# Patient Record
Sex: FEMALE | Race: ASIAN | NOT HISPANIC OR LATINO
[De-identification: names, ages, dates, MRNs, and addresses within clinical notes are randomized per-mention and may not be internally consistent; named-entity substitution may affect disease eponyms.]

---

## 2019-06-18 ENCOUNTER — ASOB RESULT (OUTPATIENT)
Age: 34
End: 2019-06-18

## 2019-06-18 ENCOUNTER — APPOINTMENT (OUTPATIENT)
Dept: ANTEPARTUM | Facility: CLINIC | Age: 34
End: 2019-06-18
Payer: COMMERCIAL

## 2019-06-18 PROCEDURE — 76811 OB US DETAILED SNGL FETUS: CPT

## 2019-06-18 PROCEDURE — 76817 TRANSVAGINAL US OBSTETRIC: CPT | Mod: 59

## 2019-10-27 ENCOUNTER — INPATIENT (INPATIENT)
Facility: HOSPITAL | Age: 34
LOS: 1 days | Discharge: HOME | End: 2019-10-29
Attending: OBSTETRICS & GYNECOLOGY | Admitting: OBSTETRICS & GYNECOLOGY
Payer: COMMERCIAL

## 2019-10-27 VITALS — HEIGHT: 62 IN | WEIGHT: 147.71 LBS

## 2019-10-27 DIAGNOSIS — Z3A.00 WEEKS OF GESTATION OF PREGNANCY NOT SPECIFIED: ICD-10-CM

## 2019-10-27 DIAGNOSIS — O26.899 OTHER SPECIFIED PREGNANCY RELATED CONDITIONS, UNSPECIFIED TRIMESTER: ICD-10-CM

## 2019-10-27 DIAGNOSIS — Z34.80 ENCOUNTER FOR SUPERVISION OF OTHER NORMAL PREGNANCY, UNSPECIFIED TRIMESTER: ICD-10-CM

## 2019-10-27 LAB
BASOPHILS # BLD AUTO: 0.02 K/UL — SIGNIFICANT CHANGE UP (ref 0–0.2)
BASOPHILS NFR BLD AUTO: 0.2 % — SIGNIFICANT CHANGE UP (ref 0–2)
BLD GP AB SCN SERPL QL: NEGATIVE — SIGNIFICANT CHANGE UP
EOSINOPHIL # BLD AUTO: 0.11 K/UL — SIGNIFICANT CHANGE UP (ref 0–0.5)
EOSINOPHIL NFR BLD AUTO: 1.3 % — SIGNIFICANT CHANGE UP (ref 0–6)
HCT VFR BLD CALC: 29.2 % — LOW (ref 34.5–45)
HGB BLD-MCNC: 8.3 G/DL — LOW (ref 11.5–15.5)
IMM GRANULOCYTES NFR BLD AUTO: 0.6 % — SIGNIFICANT CHANGE UP (ref 0–1.5)
LYMPHOCYTES # BLD AUTO: 1.71 K/UL — SIGNIFICANT CHANGE UP (ref 1–3.3)
LYMPHOCYTES # BLD AUTO: 20.2 % — SIGNIFICANT CHANGE UP (ref 13–44)
MCHC RBC-ENTMCNC: 21.4 PG — LOW (ref 27–34)
MCHC RBC-ENTMCNC: 28.4 GM/DL — LOW (ref 32–36)
MCV RBC AUTO: 75.5 FL — LOW (ref 80–100)
MONOCYTES # BLD AUTO: 0.43 K/UL — SIGNIFICANT CHANGE UP (ref 0–0.9)
MONOCYTES NFR BLD AUTO: 5.1 % — SIGNIFICANT CHANGE UP (ref 2–14)
NEUTROPHILS # BLD AUTO: 6.13 K/UL — SIGNIFICANT CHANGE UP (ref 1.8–7.4)
NEUTROPHILS NFR BLD AUTO: 72.6 % — SIGNIFICANT CHANGE UP (ref 43–77)
NRBC # BLD: 0 /100 WBCS — SIGNIFICANT CHANGE UP (ref 0–0)
PLATELET # BLD AUTO: 312 K/UL — SIGNIFICANT CHANGE UP (ref 150–400)
RBC # BLD: 3.87 M/UL — SIGNIFICANT CHANGE UP (ref 3.8–5.2)
RBC # FLD: 17.3 % — HIGH (ref 10.3–14.5)
RH IG SCN BLD-IMP: POSITIVE — SIGNIFICANT CHANGE UP
WBC # BLD: 8.45 K/UL — SIGNIFICANT CHANGE UP (ref 3.8–10.5)
WBC # FLD AUTO: 8.45 K/UL — SIGNIFICANT CHANGE UP (ref 3.8–10.5)

## 2019-10-27 RX ORDER — FENTANYL CITRATE 50 UG/ML
100 INJECTION INTRAVENOUS ONCE
Refills: 0 | Status: DISCONTINUED | OUTPATIENT
Start: 2019-10-27 | End: 2019-10-27

## 2019-10-27 RX ORDER — OXYCODONE HYDROCHLORIDE 5 MG/1
5 TABLET ORAL
Refills: 0 | Status: DISCONTINUED | OUTPATIENT
Start: 2019-10-27 | End: 2019-10-29

## 2019-10-27 RX ORDER — BENZOCAINE 10 %
1 GEL (GRAM) MUCOUS MEMBRANE EVERY 6 HOURS
Refills: 0 | Status: DISCONTINUED | OUTPATIENT
Start: 2019-10-27 | End: 2019-10-29

## 2019-10-27 RX ORDER — SODIUM CHLORIDE 9 MG/ML
1000 INJECTION, SOLUTION INTRAVENOUS
Refills: 0 | Status: DISCONTINUED | OUTPATIENT
Start: 2019-10-27 | End: 2019-10-27

## 2019-10-27 RX ORDER — DIPHENHYDRAMINE HCL 50 MG
25 CAPSULE ORAL EVERY 6 HOURS
Refills: 0 | Status: DISCONTINUED | OUTPATIENT
Start: 2019-10-27 | End: 2019-10-29

## 2019-10-27 RX ORDER — AMPICILLIN TRIHYDRATE 250 MG
1 CAPSULE ORAL EVERY 4 HOURS
Refills: 0 | Status: DISCONTINUED | OUTPATIENT
Start: 2019-10-27 | End: 2019-10-27

## 2019-10-27 RX ORDER — OXYTOCIN 10 UNIT/ML
333.33 VIAL (ML) INJECTION
Qty: 20 | Refills: 0 | Status: DISCONTINUED | OUTPATIENT
Start: 2019-10-27 | End: 2019-10-29

## 2019-10-27 RX ORDER — OXYTOCIN 10 UNIT/ML
4 VIAL (ML) INJECTION
Qty: 30 | Refills: 0 | Status: DISCONTINUED | OUTPATIENT
Start: 2019-10-27 | End: 2019-10-29

## 2019-10-27 RX ORDER — KETOROLAC TROMETHAMINE 30 MG/ML
30 SYRINGE (ML) INJECTION ONCE
Refills: 0 | Status: DISCONTINUED | OUTPATIENT
Start: 2019-10-27 | End: 2019-10-27

## 2019-10-27 RX ORDER — HYDROMORPHONE HYDROCHLORIDE 2 MG/ML
0.5 INJECTION INTRAMUSCULAR; INTRAVENOUS; SUBCUTANEOUS ONCE
Refills: 0 | Status: DISCONTINUED | OUTPATIENT
Start: 2019-10-27 | End: 2019-10-27

## 2019-10-27 RX ORDER — SODIUM CHLORIDE 9 MG/ML
3 INJECTION INTRAMUSCULAR; INTRAVENOUS; SUBCUTANEOUS EVERY 8 HOURS
Refills: 0 | Status: DISCONTINUED | OUTPATIENT
Start: 2019-10-27 | End: 2019-10-29

## 2019-10-27 RX ORDER — OXYCODONE HYDROCHLORIDE 5 MG/1
5 TABLET ORAL ONCE
Refills: 0 | Status: DISCONTINUED | OUTPATIENT
Start: 2019-10-27 | End: 2019-10-29

## 2019-10-27 RX ORDER — HYDROCORTISONE 1 %
1 OINTMENT (GRAM) TOPICAL EVERY 6 HOURS
Refills: 0 | Status: DISCONTINUED | OUTPATIENT
Start: 2019-10-27 | End: 2019-10-29

## 2019-10-27 RX ORDER — TETANUS TOXOID, REDUCED DIPHTHERIA TOXOID AND ACELLULAR PERTUSSIS VACCINE, ADSORBED 5; 2.5; 8; 8; 2.5 [IU]/.5ML; [IU]/.5ML; UG/.5ML; UG/.5ML; UG/.5ML
0.5 SUSPENSION INTRAMUSCULAR ONCE
Refills: 0 | Status: DISCONTINUED | OUTPATIENT
Start: 2019-10-27 | End: 2019-10-29

## 2019-10-27 RX ORDER — IBUPROFEN 200 MG
600 TABLET ORAL EVERY 6 HOURS
Refills: 0 | Status: DISCONTINUED | OUTPATIENT
Start: 2019-10-27 | End: 2019-10-29

## 2019-10-27 RX ORDER — GLYCERIN ADULT
1 SUPPOSITORY, RECTAL RECTAL AT BEDTIME
Refills: 0 | Status: DISCONTINUED | OUTPATIENT
Start: 2019-10-27 | End: 2019-10-29

## 2019-10-27 RX ORDER — ACETAMINOPHEN 500 MG
975 TABLET ORAL
Refills: 0 | Status: DISCONTINUED | OUTPATIENT
Start: 2019-10-27 | End: 2019-10-29

## 2019-10-27 RX ORDER — AMPICILLIN TRIHYDRATE 250 MG
2 CAPSULE ORAL ONCE
Refills: 0 | Status: COMPLETED | OUTPATIENT
Start: 2019-10-27 | End: 2019-10-27

## 2019-10-27 RX ORDER — PRAMOXINE HYDROCHLORIDE 150 MG/15G
1 AEROSOL, FOAM RECTAL EVERY 4 HOURS
Refills: 0 | Status: DISCONTINUED | OUTPATIENT
Start: 2019-10-27 | End: 2019-10-29

## 2019-10-27 RX ORDER — CITRIC ACID/SODIUM CITRATE 300-500 MG
15 SOLUTION, ORAL ORAL EVERY 6 HOURS
Refills: 0 | Status: DISCONTINUED | OUTPATIENT
Start: 2019-10-27 | End: 2019-10-27

## 2019-10-27 RX ORDER — MAGNESIUM HYDROXIDE 400 MG/1
30 TABLET, CHEWABLE ORAL
Refills: 0 | Status: DISCONTINUED | OUTPATIENT
Start: 2019-10-27 | End: 2019-10-29

## 2019-10-27 RX ORDER — DIBUCAINE 1 %
1 OINTMENT (GRAM) RECTAL EVERY 6 HOURS
Refills: 0 | Status: DISCONTINUED | OUTPATIENT
Start: 2019-10-27 | End: 2019-10-29

## 2019-10-27 RX ORDER — SIMETHICONE 80 MG/1
80 TABLET, CHEWABLE ORAL EVERY 4 HOURS
Refills: 0 | Status: DISCONTINUED | OUTPATIENT
Start: 2019-10-27 | End: 2019-10-29

## 2019-10-27 RX ORDER — IBUPROFEN 200 MG
600 TABLET ORAL EVERY 6 HOURS
Refills: 0 | Status: COMPLETED | OUTPATIENT
Start: 2019-10-27 | End: 2020-09-24

## 2019-10-27 RX ORDER — LANOLIN
1 OINTMENT (GRAM) TOPICAL EVERY 6 HOURS
Refills: 0 | Status: DISCONTINUED | OUTPATIENT
Start: 2019-10-27 | End: 2019-10-29

## 2019-10-27 RX ORDER — AER TRAVELER 0.5 G/1
1 SOLUTION RECTAL; TOPICAL EVERY 4 HOURS
Refills: 0 | Status: DISCONTINUED | OUTPATIENT
Start: 2019-10-27 | End: 2019-10-29

## 2019-10-27 RX ADMIN — SODIUM CHLORIDE 125 MILLILITER(S): 9 INJECTION, SOLUTION INTRAVENOUS at 11:21

## 2019-10-27 RX ADMIN — Medication 975 MILLIGRAM(S): at 19:00

## 2019-10-27 RX ADMIN — Medication 216 GRAM(S): at 08:30

## 2019-10-27 RX ADMIN — Medication 30 MILLIGRAM(S): at 14:18

## 2019-10-27 RX ADMIN — Medication 108 GRAM(S): at 12:19

## 2019-10-27 RX ADMIN — Medication 4 MILLIUNIT(S)/MIN: at 10:42

## 2019-10-27 RX ADMIN — Medication 0.2 MILLIGRAM(S): at 23:04

## 2019-10-27 RX ADMIN — HYDROMORPHONE HYDROCHLORIDE 0.5 MILLIGRAM(S): 2 INJECTION INTRAMUSCULAR; INTRAVENOUS; SUBCUTANEOUS at 17:36

## 2019-10-27 RX ADMIN — Medication 0.2 MILLIGRAM(S): at 19:08

## 2019-10-27 RX ADMIN — Medication 600 MILLIGRAM(S): at 23:04

## 2019-10-27 NOTE — PROVIDER CONTACT NOTE (OTHER) - SITUATION
pt stated that dr in L&D told her she was having heavy bleeding and that if she saturated the pad by the time she got to the room to her her.

## 2019-10-27 NOTE — PROVIDER CONTACT NOTE (OTHER) - ASSESSMENT
Heavy bleeding and uterus firm and to the right, denies pain or dizziness. Pt able to get OOB and void 200cc. clean pad placed and bleeding decreased. Pt checked 30 minutes later when uterus palated  and blood gushed out. Uterus was firm and massaged, resident called.

## 2019-10-27 NOTE — PROVIDER CONTACT NOTE (OTHER) - ACTION/TREATMENT ORDERED:
Resident said she was going to call back after speaking with ob attending, and both came to floor to asses pt

## 2019-10-27 NOTE — PRE-ANESTHESIA EVALUATION ADULT - NSANTHOSAYNRD_GEN_A_CORE
No. HAM screening performed.  STOP BANG Legend: 0-2 = LOW Risk; 3-4 = INTERMEDIATE Risk; 5-8 = HIGH Risk

## 2019-10-27 NOTE — PROVIDER CONTACT NOTE (CHANGE IN STATUS NOTIFICATION) - ASSESSMENT
fundus firm at midline. DTV. upon pressing on fundus heavy bleeding noted on pad. Dr Harvey to bedside to assess fundus after pad change.  Moderate bleeding noted. Fundus firm at umbilicus.  pt's VS wnl.

## 2019-10-27 NOTE — CHART NOTE - NSCHARTNOTEFT_GEN_A_CORE
R1 Postpartum Note    S: Called to evaluate patient for increased bleeding after being transferred to the floor. Pt is PPD0 from , EBL 250cc with no lacerations. Nurse reports patient has bled through two pads since being transferred approximately 1.5 hours prior. Pt denies lightheadedness, dizziness, chest pain, SOB. Pt has voided spontaneously once upon transfer to the floor, 200cc of urine. Has been unable to void since.     O:  VS  T(C): 36.8 (10-27-19 @ 17:27)  HR: 72 (10-27-19 @ 17:27)  BP: 109/72 (10-27-19 @ 17:27)  RR: 18 (10-27-19 @ 17:27)  SpO2: 98% (10-27-19 @ :27)    Gen: in NAD  CV: RRR, nl S1/S2  chest: CTAB  Abd: soft nontender, appropriately distended postpartum abdomen; fundus firm, slightly deviated to right  : increased lochia with fundal palpation  BSUS: clots noted within uterine cavity    A/P: R1 Postpartum Note    S: Called to evaluate patient for increased bleeding after being transferred to the floor. Pt is PPD0 from [facility], EBL 250cc with no lacerations. Nurse reports patient has bled through two pads since being transferred approximately 1.5 hours prior. Pt denies lightheadedness, dizziness, chest pain, SOB. Pt has voided spontaneously once upon transfer to the floor, 200cc of urine. Has been unable to void since. BSUS showed retained clots in the uterus. Patient verbally consented for manual sweep of uterus to remove clot. Pt expressed understanding and requested pain medication prior to procedure; 0.5mg Dilaudid administered.     O:  VS  T(C): 36.8 (10-27-19 @ 17:27)  HR: 72 (10-27-19 @ 17:27)  BP: 109/72 (10-27-19 @ 17:27)  RR: 18 (10-27-19 @ 17:27)  SpO2: 98% (10-27-19 @ 17:27)    Gen: in NAD  CV: RRR, nl S1/S2  chest: CTAB  Abd: soft nontender, appropriately distended postpartum abdomen; fundus firm, slightly deviated to right  : increased lochia with fundal palpation  BSUS: clots noted within uterine cavity  Manual sweep (by Dr. Fletcher): 440cc dark red blood clot manually removed from uterus; follow up BSUS showed appropriate removal of clot with thin endometrial stripe. Fundus firm and midline.     A/P: 35 yo  PPD0 from [facility];  with 440cc clot manually removed after transfer to PP floor. Pt hemodynamically and otherwise clinically stable.   -Will start patient on methergine series   -Follow up day 1 H/H  -Monitor vitals closely    Patient seen with Dr. Fletcher ()  D/w Dr. Rifkin Robyn Frankel PGY1

## 2019-10-27 NOTE — CHART NOTE - NSCHARTNOTEFT_GEN_A_CORE
R1 Postpartum check    S: 33 yo  now PPD0 . Called to evaluate patient for increased bleeding. Patient has been ambulating without difficulty. Has voided spontaneously. Denies lightheadedness, dizziness, chest pain, shortness of breath.     O:   VS  T(C): 37 (10-27-19 @ 14:45)  HR: 62 (10-27-19 @ 15:43)  BP: 101/58 (10-27-19 @ 15:43)  RR: 18 (10-27-19 @ 15:43)  SpO2: 100% (10-27-19 @ 15:43)    Gen: in NAD, lying in bed  CV: RRR, nl S1/S2  Chest: CTAB  Abdomen: soft, non tender, fundus firm  : lochia wnl    A/P: 33yo  now PPD0 s/p . Patient is stable post partum with appropriate postpartum lochia.   -Continue to monitor bleeding. Pt counseled to inform team if bleeding increases, or she develops symptomatic anemia  -monitor vitals closely  -pt stable for transfer to the floor    D/w Dr. Fletcher,   Robyn Frankel PGY1

## 2019-10-27 NOTE — CHART NOTE - NSCHARTNOTEFT_GEN_A_CORE
Paged to patient bedside for increased bleeding and deviated uterus. Bedside ultrasound performed with large clot noted in the uterus. Patient verbally consented for manual sweep and 0.5mg of Dialudid administered. Manual sweep performed with a QBL of 450cc. Uterus small, firm, and midline following sweep. Bleeding significantly improved. Immediate ultrasound following sweep showed a thin stripe and no remaining clot. We will give her a Methergine course. Dr. Damian notified via phone.     Iesha Fletcher MD

## 2019-10-27 NOTE — PROVIDER CONTACT NOTE (CHANGE IN STATUS NOTIFICATION) - RECOMMENDATIONS
Pt instructed to monitor bleeding and report any s/s of hemorraging or passing of clots to Postpartum nurse. Pt verbalized understanding. Pt denies any dizziness SOB.

## 2019-10-28 LAB
HCT VFR BLD CALC: 25.1 % — LOW (ref 34.5–45)
HGB BLD-MCNC: 7.3 G/DL — LOW (ref 11.5–15.5)
T PALLIDUM AB TITR SER: NEGATIVE — SIGNIFICANT CHANGE UP

## 2019-10-28 RX ORDER — ASCORBIC ACID 60 MG
500 TABLET,CHEWABLE ORAL DAILY
Refills: 0 | Status: DISCONTINUED | OUTPATIENT
Start: 2019-10-28 | End: 2019-10-29

## 2019-10-28 RX ORDER — FERROUS SULFATE 325(65) MG
325 TABLET ORAL THREE TIMES A DAY
Refills: 0 | Status: DISCONTINUED | OUTPATIENT
Start: 2019-10-28 | End: 2019-10-29

## 2019-10-28 RX ADMIN — Medication 1 TABLET(S): at 12:02

## 2019-10-28 RX ADMIN — Medication 975 MILLIGRAM(S): at 15:56

## 2019-10-28 RX ADMIN — Medication 600 MILLIGRAM(S): at 12:30

## 2019-10-28 RX ADMIN — Medication 500 MILLIGRAM(S): at 12:02

## 2019-10-28 RX ADMIN — Medication 975 MILLIGRAM(S): at 23:00

## 2019-10-28 RX ADMIN — Medication 600 MILLIGRAM(S): at 06:55

## 2019-10-28 RX ADMIN — Medication 600 MILLIGRAM(S): at 18:50

## 2019-10-28 RX ADMIN — Medication 0.2 MILLIGRAM(S): at 03:08

## 2019-10-28 RX ADMIN — Medication 975 MILLIGRAM(S): at 03:09

## 2019-10-28 RX ADMIN — Medication 975 MILLIGRAM(S): at 04:10

## 2019-10-28 RX ADMIN — Medication 600 MILLIGRAM(S): at 05:57

## 2019-10-28 RX ADMIN — Medication 600 MILLIGRAM(S): at 18:21

## 2019-10-28 RX ADMIN — SODIUM CHLORIDE 3 MILLILITER(S): 9 INJECTION INTRAMUSCULAR; INTRAVENOUS; SUBCUTANEOUS at 14:00

## 2019-10-28 RX ADMIN — Medication 975 MILLIGRAM(S): at 16:30

## 2019-10-28 RX ADMIN — Medication 975 MILLIGRAM(S): at 10:10

## 2019-10-28 RX ADMIN — Medication 0.2 MILLIGRAM(S): at 06:46

## 2019-10-28 RX ADMIN — Medication 325 MILLIGRAM(S): at 22:09

## 2019-10-28 RX ADMIN — Medication 975 MILLIGRAM(S): at 22:09

## 2019-10-28 RX ADMIN — Medication 325 MILLIGRAM(S): at 12:02

## 2019-10-28 RX ADMIN — Medication 975 MILLIGRAM(S): at 09:39

## 2019-10-28 RX ADMIN — Medication 0.2 MILLIGRAM(S): at 10:50

## 2019-10-28 RX ADMIN — Medication 600 MILLIGRAM(S): at 12:01

## 2019-10-28 RX ADMIN — Medication 600 MILLIGRAM(S): at 00:00

## 2019-10-28 NOTE — PROGRESS NOTE ADULT - PROBLEM SELECTOR PLAN 1
- Pain well controlled, continue current pain regimen  - Increase ambulation, SCDs when not ambulating  - Continue to monitor vaginal bleeding  - Continue regular diet    Stephanie PGY1

## 2019-10-28 NOTE — PROGRESS NOTE ADULT - SUBJECTIVE AND OBJECTIVE BOX
OB Progress Note:  PPD#1    S: 35yo  PPD#1 s/p . Patient feels well. Pain is well controlled. She is tolerating a regular diet and passing flatus. She is voiding spontaneously, and ambulating without difficulty. Denies CP/SOB. Denies lightheadedness/dizziness. Denies N/V.    Pt followed closely after the passage of retained clots on PPD0 (440 cc). Pt has not passed any further clots.     O:  Vitals:  Vital Signs Last 24 Hrs  T(C): 36.3 (27 Oct 2019 20:51), Max: 37 (27 Oct 2019 14:45)  T(F): 97.4 (27 Oct 2019 20:51), Max: 98.6 (27 Oct 2019 14:45)  HR: 74 (27 Oct 2019 20:51) (55 - 76)  BP: 96/61 (27 Oct 2019 20:51) (96/61 - 121/75)  BP(mean): --  RR: 18 (27 Oct 2019 20:51) (18 - 18)  SpO2: 100% (27 Oct 2019 20:51) (98% - 100%)    MEDICATIONS  (STANDING):  acetaminophen   Tablet .. 975 milliGRAM(s) Oral <User Schedule>  diphtheria/tetanus/pertussis (acellular) Vaccine (ADAcel) 0.5 milliLiter(s) IntraMuscular once  ibuprofen  Tablet. 600 milliGRAM(s) Oral every 6 hours  methylergonovine 0.2 milliGRAM(s) Oral every 4 hours  oxytocin Infusion 333.333 milliUNIT(s)/Min (1000 mL/Hr) IV Continuous <Continuous>  oxytocin Infusion 333.333 milliUNIT(s)/Min (1000 mL/Hr) IV Continuous <Continuous>  oxytocin Infusion 4 milliUNIT(s)/Min (4 mL/Hr) IV Continuous <Continuous>  prenatal multivitamin 1 Tablet(s) Oral daily  sodium chloride 0.9% lock flush 3 milliLiter(s) IV Push every 8 hours      Labs:  Blood type: A Positive  Rubella IgG: RPR: Negative                          8.3<L>   8.45 >-----------< 312    ( 10- @ 08:40 )             29.2<L>                  Physical Exam:  General: NAD  Abdomen: soft, non-tender, non-distended, fundus firm  Vaginal: Lochia wnl  Extremities: No erythema/edema

## 2019-10-29 ENCOUNTER — TRANSCRIPTION ENCOUNTER (OUTPATIENT)
Age: 34
End: 2019-10-29

## 2019-10-29 VITALS
TEMPERATURE: 98 F | SYSTOLIC BLOOD PRESSURE: 97 MMHG | RESPIRATION RATE: 18 BRPM | HEART RATE: 67 BPM | DIASTOLIC BLOOD PRESSURE: 64 MMHG

## 2019-10-29 PROCEDURE — 86850 RBC ANTIBODY SCREEN: CPT

## 2019-10-29 PROCEDURE — 85018 HEMOGLOBIN: CPT

## 2019-10-29 PROCEDURE — 86901 BLOOD TYPING SEROLOGIC RH(D): CPT

## 2019-10-29 PROCEDURE — 85027 COMPLETE CBC AUTOMATED: CPT

## 2019-10-29 PROCEDURE — 59025 FETAL NON-STRESS TEST: CPT

## 2019-10-29 PROCEDURE — G0463: CPT

## 2019-10-29 PROCEDURE — 86780 TREPONEMA PALLIDUM: CPT

## 2019-10-29 PROCEDURE — 86900 BLOOD TYPING SEROLOGIC ABO: CPT

## 2019-10-29 PROCEDURE — 85014 HEMATOCRIT: CPT

## 2019-10-29 PROCEDURE — 59050 FETAL MONITOR W/REPORT: CPT

## 2019-10-29 RX ADMIN — Medication 975 MILLIGRAM(S): at 06:28

## 2019-10-29 RX ADMIN — Medication 975 MILLIGRAM(S): at 06:57

## 2019-10-29 NOTE — DISCHARGE NOTE OB - MATERIALS PROVIDED
[facility] [place] Screening Program/[facility] Hearing Screen Program/Shaken Baby Prevention Handout/Breastfeeding Mother’s Support Group Information/Back To Sleep Handout/Birth Certificate Instructions/New Beginnings

## 2019-10-29 NOTE — PROGRESS NOTE ADULT - ASSESSMENT
A/P: 33yo PPD#2 s/p .  Pt had PP course c/b retained blood clots. Bleeding significantly improved, no further evidence of clots. Patient is stable and doing well post-partum.

## 2019-10-29 NOTE — DISCHARGE NOTE OB - PATIENT PORTAL LINK FT
You can access the FollowMyHealth Patient Portal offered by [facility] by registering at the following website: [email or web address]. By joining [facility]’s FollowMyHealth portal, you will also be able to view your health information using other applications (apps) compatible with our system.

## 2019-10-29 NOTE — DISCHARGE NOTE OB - CARE PROVIDER_API CALL
Taye Damian)  Obstetrics and Gynecology  [address] [place] [id number]  Phone: [phone]  Fax: [phone]  Follow Up Time:

## 2019-10-29 NOTE — PROGRESS NOTE ADULT - SUBJECTIVE AND OBJECTIVE BOX
OB Progress Note:  PPD#2    S: 35yo PPD#2 s/p . Patient feels well. Pain is well controlled. She is tolerating a regular diet and passing flatus. She is voiding spontaneously, and ambulating without difficulty. Denies CP/SOB. Denies lightheadedness/dizziness. Denies N/V.    O:  Vitals:   Vital Signs Last 24 Hrs  T(C): 36.6 (29 Oct 2019 05:16), Max: 36.9 (28 Oct 2019 13:00)  T(F): 97.9 (29 Oct 2019 05:16), Max: 98.4 (28 Oct 2019 13:00)  HR: 67 (29 Oct 2019 05:16) (67 - 80)  BP: 97/64 (29 Oct 2019 05:16) (97/63 - 113/80)  BP(mean): --  RR: 18 (29 Oct 2019 05:16) (18 - 18)  SpO2: 98% (28 Oct 2019 17:15) (98% - 99%)    MEDICATIONS  (STANDING):  acetaminophen   Tablet .. 975 milliGRAM(s) Oral <User Schedule>  ascorbic acid 500 milliGRAM(s) Oral daily  diphtheria/tetanus/pertussis (acellular) Vaccine (ADAcel) 0.5 milliLiter(s) IntraMuscular once  ferrous    sulfate 325 milliGRAM(s) Oral three times a day  ibuprofen  Tablet. 600 milliGRAM(s) Oral every 6 hours  oxytocin Infusion 333.333 milliUNIT(s)/Min (1000 mL/Hr) IV Continuous <Continuous>  oxytocin Infusion 333.333 milliUNIT(s)/Min (1000 mL/Hr) IV Continuous <Continuous>  oxytocin Infusion 4 milliUNIT(s)/Min (4 mL/Hr) IV Continuous <Continuous>  prenatal multivitamin 1 Tablet(s) Oral daily  sodium chloride 0.9% lock flush 3 milliLiter(s) IV Push every 8 hours    MEDICATIONS  (PRN):  benzocaine 20%/menthol 0.5% Spray 1 Spray(s) Topical every 6 hours PRN for Perineal discomfort  dibucaine 1% Ointment 1 Application(s) Topical every 6 hours PRN Perineal discomfort  diphenhydrAMINE 25 milliGRAM(s) Oral every 6 hours PRN Pruritus  glycerin Suppository - Adult 1 Suppository(s) Rectal at bedtime PRN Constipation  hydrocortisone 1% Cream 1 Application(s) Topical every 6 hours PRN Moderate Pain (4-6)  lanolin Ointment 1 Application(s) Topical every 6 hours PRN nipple soreness  magnesium hydroxide Suspension 30 milliLiter(s) Oral two times a day PRN Constipation  oxyCODONE    IR 5 milliGRAM(s) Oral every 3 hours PRN Moderate to Severe Pain (4-10)  oxyCODONE    IR 5 milliGRAM(s) Oral once PRN Moderate to Severe Pain (4-10)  pramoxine 1%/zinc 5% Cream 1 Application(s) Topical every 4 hours PRN Moderate Pain (4-6)  simethicone 80 milliGRAM(s) Chew every 4 hours PRN Gas  witch hazel Pads 1 Application(s) Topical every 4 hours PRN Perineal discomfort      Labs:  Blood type: A Positive  Rubella IgG: RPR: Negative                          7.3<L>   -- >-----------< --    ( 10-28 @ 09:12 )             25.1<L>                        8.3<L>   8.45 >-----------< 312    ( 10-27 @ 08:40 )             29.2<L>                  Physical Exam:  General: NAD  Abdomen: soft, non-tender, non-distended, fundus firm  Vaginal: Lochia wnl  Extremities: No erythema/edema

## 2019-10-29 NOTE — PROGRESS NOTE ADULT - PROBLEM SELECTOR PLAN 1
- Pain well controlled, continue current pain regimen  - Increase ambulation, SCDs when not ambulating  - Continue regular diet  - Discharge planning     Stephanie PGY1

## 2019-10-29 NOTE — DISCHARGE NOTE OB - CARE PLAN
Principal Discharge DX:	Normal spontaneous vaginal delivery  Goal:	post-partum care  Assessment and plan of treatment:	as directed

## 2023-04-11 NOTE — PROGRESS NOTE ADULT - PROBLEM/PLAN-1
DISPLAY PLAN FREE TEXT yes Bactrim Counseling:  I discussed with the patient the risks of sulfa antibiotics including but not limited to GI upset, allergic reaction, drug rash, diarrhea, dizziness, photosensitivity, and yeast infections.  Rarely, more serious reactions can occur including but not limited to aplastic anemia, agranulocytosis, methemoglobinemia, blood dyscrasias, liver or kidney failure, lung infiltrates or desquamative/blistering drug rashes.

## 2024-04-07 NOTE — PATIENT PROFILE OB - BREASTFEEDING PROVIDES MATERNAL HEALTH BENEFITS, DECREASED PREMENOPAUSAL BREAST CANCER, OVARIAN CANCER AND TYPE II DIABETES MELLITUS
anemia  constipation  abd pain    bowel regimen  s/p enema and suppository-4/6  monitor for BM  simethicone  s/p mg citrate bottle   continue to monitor cbc  d/w pt    I reviewed the overnight course of events on the unit, re-confirming the patient history. I discussed the care with the patient  Differential diagnosis and plan of care discussed with patient after the evaluation  35 minutes spent on total encounter of which more than fifty percent of the encounter was spent counseling and/or coordinating care by the attending physician. Statement Selected

## 2024-04-26 ENCOUNTER — OUTPATIENT (OUTPATIENT)
Dept: OUTPATIENT SERVICES | Facility: HOSPITAL | Age: 39
LOS: 1 days | End: 2024-04-26
Payer: COMMERCIAL

## 2024-04-26 ENCOUNTER — APPOINTMENT (OUTPATIENT)
Dept: MAMMOGRAPHY | Facility: IMAGING CENTER | Age: 39
End: 2024-04-26
Payer: COMMERCIAL

## 2024-04-26 DIAGNOSIS — Z00.8 ENCOUNTER FOR OTHER GENERAL EXAMINATION: ICD-10-CM

## 2024-04-26 PROCEDURE — 77063 BREAST TOMOSYNTHESIS BI: CPT

## 2024-04-26 PROCEDURE — 76641 ULTRASOUND BREAST COMPLETE: CPT | Mod: 26,50

## 2024-04-26 PROCEDURE — 76641 ULTRASOUND BREAST COMPLETE: CPT

## 2024-04-26 PROCEDURE — 77067 SCR MAMMO BI INCL CAD: CPT | Mod: 26

## 2024-04-26 PROCEDURE — 77067 SCR MAMMO BI INCL CAD: CPT

## 2024-04-26 PROCEDURE — 77063 BREAST TOMOSYNTHESIS BI: CPT | Mod: 26

## 2024-05-24 ENCOUNTER — APPOINTMENT (OUTPATIENT)
Dept: OBGYN | Facility: CLINIC | Age: 39
End: 2024-05-24
Payer: COMMERCIAL

## 2024-05-24 PROCEDURE — 99459 PELVIC EXAMINATION: CPT

## 2024-05-24 PROCEDURE — 96127 BRIEF EMOTIONAL/BEHAV ASSMT: CPT

## 2024-05-24 PROCEDURE — 36415 COLL VENOUS BLD VENIPUNCTURE: CPT

## 2024-05-24 PROCEDURE — 99396 PREV VISIT EST AGE 40-64: CPT
